# Patient Record
Sex: FEMALE
[De-identification: names, ages, dates, MRNs, and addresses within clinical notes are randomized per-mention and may not be internally consistent; named-entity substitution may affect disease eponyms.]

---

## 2020-09-24 PROBLEM — Z00.00 ENCOUNTER FOR PREVENTIVE HEALTH EXAMINATION: Status: ACTIVE | Noted: 2020-09-24

## 2020-10-01 ENCOUNTER — APPOINTMENT (OUTPATIENT)
Dept: RHEUMATOLOGY | Facility: CLINIC | Age: 60
End: 2020-10-01
Payer: COMMERCIAL

## 2020-10-01 VITALS
OXYGEN SATURATION: 99 % | WEIGHT: 140 LBS | SYSTOLIC BLOOD PRESSURE: 125 MMHG | TEMPERATURE: 97.9 F | DIASTOLIC BLOOD PRESSURE: 83 MMHG | HEART RATE: 76 BPM

## 2020-10-01 VITALS — BODY MASS INDEX: 24.8 KG/M2 | HEIGHT: 63 IN

## 2020-10-01 PROCEDURE — 36415 COLL VENOUS BLD VENIPUNCTURE: CPT

## 2020-10-01 PROCEDURE — 99205 OFFICE O/P NEW HI 60 MIN: CPT | Mod: 25

## 2020-10-02 NOTE — HISTORY OF PRESENT ILLNESS
[FreeTextEntry1] : 60 year old female with previously diagnosed Sjogren's syndrome presents for evaluation \par \par Previously seen and diagnosed by Dr. Prado, noted to have +MARIANNA, RF, SSa and dry mouth. No biopsy or parotid ultrasound done. Has been taking Plaquenil and MTX 10mg weekly with folic acid. Denies relief of joint pain with these therapies. \par \par Now with dry mouth\par No dry eyes\par Diffuse aches\par Fatigue\par Denies swelling in small joints\par \par In July she thought she pulled a muscle in her groin \par Now with sharp pain down the back of her LE \par Just walking when this started\par Still only bothers her when active\par Will take Celebrex PRN pain \par Did try a short course of Prednisone for the pain without much relief \par \par No swelling, no headaches, visual changes, jaw claudication, scalp tenderness, hand swelling \par \par Data reviewed:\par CT chest with fatty liver \par Knee XR with minimal OA of patellofemoral joint \par MRI C spine with central disc herniation at C3-4 and C4-5 with compression of thecal sac and left side, foraminal narrowing \par MRI shoulder ac joint arthrosis \par DXA 2019 nml \par  [Anorexia] : no anorexia [Weight Loss] : no weight loss [Fever] : no fever [Depression] : no depression [Malar Facial Rash] : no malar facial rash [Skin Lesions] : no lesions [Skin Nodules] : no skin nodules [Oral Ulcers] : no oral ulcers [Cough] : no cough [Dry Mouth] : no dry mouth [Shortness of Breath] : no shortness of breath [Chest Pain] : no chest pain [Decreased ROM] : no decreased range of motion [Morning Stiffness] : no morning stiffness [Falls] : no falls [Difficulty Standing] : no difficulty standing [Difficulty Walking] : no difficulty walking [Myalgias] : no myalgias [Muscle Weakness] : no muscle weakness [Muscle Spasms] : no muscle spasms [Muscle Cramping] : no muscle cramping [Visual Changes] : no visual changes [Eye Pain] : no eye pain [Eye Redness] : no eye redness [Dry Eyes] : no dry eyes

## 2020-10-02 NOTE — ASSESSMENT
[FreeTextEntry1] : 60 year old female with previously diagnosed Sjogren's syndrome presents for evaluation \par No evidence of sicca on exam today though patient does endorse dry mouth \par Re check serologies today, consider objective testing or US, lip biopsy \par Continue Plaquenil 200mg BID and f/u with ophtho\par Stop MTX given lack of efficacy and mostly muscular pain, recommend low impact anaerobic exercise\par PT for pulled muscle, voltaren gel, stretching \par

## 2020-10-02 NOTE — PHYSICAL EXAM
[General Appearance - Alert] : alert [General Appearance - In No Acute Distress] : in no acute distress [General Appearance - Well Nourished] : well nourished [General Appearance - Well Developed] : well developed [General Appearance - Well-Appearing] : healthy appearing [Sclera] : the sclera and conjunctiva were normal [FreeTextEntry1] : no dry mouth or parotid enlargement, no tongue furrowing [Respiration, Rhythm And Depth] : normal respiratory rhythm and effort [Auscultation Breath Sounds / Voice Sounds] : lungs were clear to auscultation bilaterally [Heart Rate And Rhythm] : heart rate was normal and rhythm regular [Heart Sounds] : normal S1 and S2 [Edema] : there was no peripheral edema [Cervical Lymph Nodes Enlarged Anterior Bilaterally] : anterior cervical [No Spinal Tenderness] : no spinal tenderness [Musculoskeletal - Swelling] : no joint swelling seen [Oriented To Time, Place, And Person] : oriented to person, place, and time

## 2020-10-07 LAB
ALBUMIN SERPL ELPH-MCNC: 4.4 G/DL
ALP BLD-CCNC: 103 U/L
ALT SERPL-CCNC: 9 U/L
ANA SER IF-ACNC: NEGATIVE
ANION GAP SERPL CALC-SCNC: 16 MMOL/L
APTT 2H P 1:4 NP PPP: 33.6 SEC
APTT 2H P INC PPP: 34.7 SEC
APTT HEX PL PPP: NEGATIVE
APTT IMM NP/PRE NP PPP: 33.2 SEC
APTT INV RATIO PPP: 35.7 SEC
AST SERPL-CCNC: 17 U/L
B2 GLYCOPROT1 IGA SERPL IA-ACNC: <5 SAU
B2 GLYCOPROT1 IGG SER-ACNC: <5 SGU
B2 GLYCOPROT1 IGM SER-ACNC: <5 SMU
BASOPHILS # BLD AUTO: 0.05 K/UL
BASOPHILS NFR BLD AUTO: 0.7 %
BILIRUB SERPL-MCNC: 0.3 MG/DL
BUN SERPL-MCNC: 12 MG/DL
C3 SERPL-MCNC: 188 MG/DL
C4 SERPL-MCNC: 34 MG/DL
CALCIUM SERPL-MCNC: 9.7 MG/DL
CARDIOLIPIN IGM SER-MCNC: <5 GPL
CCP AB SER IA-ACNC: <8 UNITS
CHLORIDE SERPL-SCNC: 102 MMOL/L
CO2 SERPL-SCNC: 22 MMOL/L
CONFIRM: 28.1 SEC
CREAT SERPL-MCNC: 0.62 MG/DL
CRP SERPL-MCNC: 2.67 MG/DL
DEPRECATED CARDIOLIPIN IGA SER: <5 APL
DRVVT IMM 1:2 NP PPP: NORMAL
DRVVT SCREEN TO CONFIRM RATIO: 1.05 RATIO
DSDNA AB SER-ACNC: <12 IU/ML
ENA RNP AB SER IA-ACNC: <0.2 AL
ENA SM AB SER IA-ACNC: <0.2 AL
ENA SS-A AB SER IA-ACNC: 6.1 AL
ENA SS-B AB SER IA-ACNC: <0.2 AL
EOSINOPHIL # BLD AUTO: 0.07 K/UL
EOSINOPHIL NFR BLD AUTO: 1 %
ERYTHROCYTE [SEDIMENTATION RATE] IN BLOOD BY WESTERGREN METHOD: 47 MM/HR
GLUCOSE SERPL-MCNC: 95 MG/DL
HCT VFR BLD CALC: 37.5 %
HEX-1: 40.4 SECS
HEX-2: 38.8 SECS
HGB BLD-MCNC: 11.8 G/DL
IGA SER QL IEP: 171 MG/DL
IGG SER QL IEP: 863 MG/DL
IGM SER QL IEP: 62 MG/DL
IMM GRANULOCYTES NFR BLD AUTO: 0.1 %
LYMPHOCYTES # BLD AUTO: 2.01 K/UL
LYMPHOCYTES NFR BLD AUTO: 29.3 %
MAN DIFF?: NORMAL
MCHC RBC-ENTMCNC: 30.2 PG
MCHC RBC-ENTMCNC: 31.5 GM/DL
MCV RBC AUTO: 95.9 FL
MONOCYTES # BLD AUTO: 0.59 K/UL
MONOCYTES NFR BLD AUTO: 8.6 %
NEUTROPHILS # BLD AUTO: 4.13 K/UL
NEUTROPHILS NFR BLD AUTO: 60.3 %
NPP NORMAL POOLED PLASMA: 33.4 SECS
PLATELET # BLD AUTO: 462 K/UL
POTASSIUM SERPL-SCNC: 4.5 MMOL/L
PROT SERPL-MCNC: 7 G/DL
PS IGA SER QL: <20 U/ML
PS IGG SER QL: <10 U/ML
PS IGM SER QL: <25 U/ML
RBC # BLD: 3.91 M/UL
RBC # FLD: 14.9 %
RF+CCP IGG SER-IMP: NEGATIVE
RHEUMATOID FACT SER QL: <10 IU/ML
SCREEN DRVVT: 32.8 SEC
SILICA CLOTTING TIME INTERPRETATION: NORMAL
SILICA CLOTTING TIME S/C: 0.99 RATIO
SODIUM SERPL-SCNC: 141 MMOL/L
WBC # FLD AUTO: 6.86 K/UL

## 2020-10-08 LAB — CARDIOLIPIN IGM SER-MCNC: 5.6 MPL

## 2020-11-20 ENCOUNTER — APPOINTMENT (OUTPATIENT)
Dept: RHEUMATOLOGY | Facility: CLINIC | Age: 60
End: 2020-11-20
Payer: COMMERCIAL

## 2020-11-20 VITALS
BODY MASS INDEX: 22.34 KG/M2 | DIASTOLIC BLOOD PRESSURE: 84 MMHG | TEMPERATURE: 97.8 F | WEIGHT: 139 LBS | HEART RATE: 87 BPM | SYSTOLIC BLOOD PRESSURE: 138 MMHG | OXYGEN SATURATION: 100 % | HEIGHT: 66 IN

## 2020-11-20 PROCEDURE — 99214 OFFICE O/P EST MOD 30 MIN: CPT | Mod: 25

## 2020-11-20 PROCEDURE — 36415 COLL VENOUS BLD VENIPUNCTURE: CPT

## 2020-11-20 RX ORDER — CELECOXIB 200 MG/1
200 CAPSULE ORAL TWICE DAILY
Qty: 60 | Refills: 3 | Status: ACTIVE | COMMUNITY
Start: 2020-11-20 | End: 1900-01-01

## 2020-11-20 RX ORDER — ASPIRIN 81 MG/1
81 TABLET ORAL DAILY
Qty: 30 | Refills: 11 | Status: ACTIVE | COMMUNITY
Start: 2020-11-20 | End: 1900-01-01

## 2020-11-23 NOTE — PHYSICAL EXAM
[General Appearance - Alert] : alert [General Appearance - In No Acute Distress] : in no acute distress [General Appearance - Well Nourished] : well nourished [General Appearance - Well Developed] : well developed [General Appearance - Well-Appearing] : healthy appearing [Sclera] : the sclera and conjunctiva were normal [Respiration, Rhythm And Depth] : normal respiratory rhythm and effort [Auscultation Breath Sounds / Voice Sounds] : lungs were clear to auscultation bilaterally [Heart Rate And Rhythm] : heart rate was normal and rhythm regular [Heart Sounds] : normal S1 and S2 [Edema] : there was no peripheral edema [Cervical Lymph Nodes Enlarged Anterior Bilaterally] : anterior cervical [No Spinal Tenderness] : no spinal tenderness [Musculoskeletal - Swelling] : no joint swelling seen [Oriented To Time, Place, And Person] : oriented to person, place, and time [FreeTextEntry1] : no dry mouth or parotid enlargement, no tongue furrowing

## 2020-11-23 NOTE — ASSESSMENT
[FreeTextEntry1] : 60 year old female with previously diagnosed Sjogren's syndrome presents for follow up \par Doing well overall \par Continue Plaquenil 200mg BID, needs to f/u with ophtho\par Encourage exercise\par Check serologies

## 2020-11-23 NOTE — HISTORY OF PRESENT ILLNESS
[FreeTextEntry1] : Initial Visit:\par Previously seen and diagnosed by Dr. Prado, noted to have +MARIANNA, RF, SSa and dry mouth. No biopsy or parotid ultrasound done. Has been taking Plaquenil and MTX 10mg weekly with folic acid. Denies relief of joint pain with these therapies. \par Now with dry mouth\par No dry eyes\par Diffuse aches\par Fatigue\par Denies swelling in small joints\par In July she thought she pulled a muscle in her groin \par Now with sharp pain down the back of her LE \par Just walking when this started\par Still only bothers her when active\par Will take Celebrex PRN pain \par Did try a short course of Prednisone for the pain without much relief \par No swelling, no headaches, visual changes, jaw claudication, scalp tenderness, hand swelling \par Data reviewed:\par CT chest with fatty liver \par Knee XR with minimal OA of patellofemoral joint \par MRI C spine with central disc herniation at C3-4 and C4-5 with compression of thecal sac and left side, foraminal narrowing \par MRI shoulder ac joint arthrosis \par DXA 2019 nml \par Plan: No evidence of sicca on exam today though patient does endorse dry mouth \par Re check serologies today, consider objective testing or US, lip biopsy \par Continue Plaquenil 200mg BID and f/u with ophtho\par Stop MTX given lack of efficacy and mostly muscular pain, recommend low impact anaerobic exercise\par PT for pulled muscle, voltaren gel, stretching  [Anorexia] : no anorexia [Weight Loss] : no weight loss [Fever] : no fever [Depression] : no depression [Malar Facial Rash] : no malar facial rash [Skin Lesions] : no lesions [Skin Nodules] : no skin nodules [Oral Ulcers] : no oral ulcers [Cough] : no cough [Dry Mouth] : no dry mouth [Shortness of Breath] : no shortness of breath [Chest Pain] : no chest pain [Decreased ROM] : no decreased range of motion [Morning Stiffness] : no morning stiffness [Falls] : no falls [Difficulty Standing] : no difficulty standing [Difficulty Walking] : no difficulty walking [Myalgias] : no myalgias [Muscle Weakness] : no muscle weakness [Muscle Spasms] : no muscle spasms [Muscle Cramping] : no muscle cramping [Visual Changes] : no visual changes [Eye Pain] : no eye pain [Eye Redness] : no eye redness [Dry Eyes] : no dry eyes

## 2020-11-25 LAB
ALBUMIN SERPL ELPH-MCNC: 4.4 G/DL
ALP BLD-CCNC: 104 U/L
ALT SERPL-CCNC: 13 U/L
ANION GAP SERPL CALC-SCNC: 19 MMOL/L
AST SERPL-CCNC: 26 U/L
BASOPHILS # BLD AUTO: 0.05 K/UL
BASOPHILS NFR BLD AUTO: 0.7 %
BILIRUB SERPL-MCNC: 0.2 MG/DL
BUN SERPL-MCNC: 16 MG/DL
C3 SERPL-MCNC: 166 MG/DL
C4 SERPL-MCNC: 30 MG/DL
CALCIUM SERPL-MCNC: 9.5 MG/DL
CCP AB SER IA-ACNC: <8 UNITS
CHLORIDE SERPL-SCNC: 103 MMOL/L
CO2 SERPL-SCNC: 18 MMOL/L
CREAT SERPL-MCNC: 0.7 MG/DL
CRP SERPL-MCNC: 1.29 MG/DL
EOSINOPHIL # BLD AUTO: 0.06 K/UL
EOSINOPHIL NFR BLD AUTO: 0.8 %
ERYTHROCYTE [SEDIMENTATION RATE] IN BLOOD BY WESTERGREN METHOD: 31 MM/HR
GLUCOSE SERPL-MCNC: 77 MG/DL
HCT VFR BLD CALC: 38.4 %
HGB BLD-MCNC: 11.7 G/DL
IGA SER QL IEP: 179 MG/DL
IGG SER QL IEP: 1002 MG/DL
IGM SER QL IEP: 57 MG/DL
IMM GRANULOCYTES NFR BLD AUTO: 0 %
LYMPHOCYTES # BLD AUTO: 2.22 K/UL
LYMPHOCYTES NFR BLD AUTO: 31.1 %
MAN DIFF?: NORMAL
MCHC RBC-ENTMCNC: 28.7 PG
MCHC RBC-ENTMCNC: 30.5 GM/DL
MCV RBC AUTO: 94.1 FL
MONOCYTES # BLD AUTO: 0.58 K/UL
MONOCYTES NFR BLD AUTO: 8.1 %
NEUTROPHILS # BLD AUTO: 4.23 K/UL
NEUTROPHILS NFR BLD AUTO: 59.3 %
PLATELET # BLD AUTO: 429 K/UL
POTASSIUM SERPL-SCNC: 5.1 MMOL/L
PROT SERPL-MCNC: 7 G/DL
RBC # BLD: 4.08 M/UL
RBC # FLD: 13.3 %
RF+CCP IGG SER-IMP: NEGATIVE
RHEUMATOID FACT SER QL: <10 IU/ML
SODIUM SERPL-SCNC: 140 MMOL/L
WBC # FLD AUTO: 7.14 K/UL

## 2021-01-07 RX ORDER — HYDROXYCHLOROQUINE SULFATE 200 MG/1
200 TABLET, FILM COATED ORAL TWICE DAILY
Qty: 60 | Refills: 6 | Status: ACTIVE | COMMUNITY
Start: 2020-11-20 | End: 1900-01-01

## 2021-02-11 ENCOUNTER — APPOINTMENT (OUTPATIENT)
Dept: RHEUMATOLOGY | Facility: CLINIC | Age: 61
End: 2021-02-11
Payer: COMMERCIAL

## 2021-02-11 VITALS
WEIGHT: 142 LBS | SYSTOLIC BLOOD PRESSURE: 134 MMHG | BODY MASS INDEX: 22.82 KG/M2 | DIASTOLIC BLOOD PRESSURE: 84 MMHG | OXYGEN SATURATION: 97 % | HEIGHT: 66 IN | HEART RATE: 84 BPM | TEMPERATURE: 97.9 F

## 2021-02-11 DIAGNOSIS — M35.00 SICCA SYNDROME, UNSPECIFIED: ICD-10-CM

## 2021-02-11 PROCEDURE — 20610 DRAIN/INJ JOINT/BURSA W/O US: CPT

## 2021-02-11 PROCEDURE — 99072 ADDL SUPL MATRL&STAF TM PHE: CPT

## 2021-02-11 PROCEDURE — 36415 COLL VENOUS BLD VENIPUNCTURE: CPT

## 2021-02-11 PROCEDURE — 99214 OFFICE O/P EST MOD 30 MIN: CPT | Mod: 25

## 2021-02-15 RX ADMIN — Medication %: at 00:00

## 2021-02-15 RX ADMIN — METHYLPREDNISOLONE ACETATE MG/ML: 40 INJECTION, SUSPENSION INTRA-ARTICULAR; INTRALESIONAL; INTRAMUSCULAR; SOFT TISSUE at 00:00

## 2021-02-15 NOTE — PROCEDURE
[Today's Date:] : Date: [unfilled] [Soft Tissue Injection] : soft tissue injection was performed [Arthrocentesis] : arthrocentesis was performed [Patient] : the patient [Risks] : risks [Benefits] : benefits [Alternatives] : alternatives [Therapeutic] : therapeutic [#1 Site: ______] : #1 site identified in the [unfilled] [___ ml Inj] : [unfilled] ~Uml [1%] : 1%  [Without Epi] : without epinephrine [Alcohol] : alcohol [Chlorhexidine] : chlorhexidine [22 gauge 1 inch] : A 22 gauge 1 inch needle was used [___ml of fluid] : [unfilled] ml of fluid was aspirated [Depomedrol ___ mg] : Depomedrol [unfilled] mg [Tolerated Well] : the patient tolerated the procedure well [Reports Improvement in Symptoms] : reports improvement in symptoms [No Complications] : there were no complications

## 2021-02-15 NOTE — HISTORY OF PRESENT ILLNESS
[FreeTextEntry1] : Office Visit 11/20/20:\par Ongoing stiffness in her anterior thighs - mostly after sitting for over 15 minutes\par 5 weeks of PT with relief of her pain - needs a break for now \par She is taking Celebrex rarely, PRN. \par No sicca \par Plan: Doing well overall \par Continue Plaquenil 200mg BID, needs to f/u with ophtho\par Encourage exercise\par Check serologies\par \par Initial Visit:\par Previously seen and diagnosed by Dr. Prado, noted to have +MARIANNA, RF, SSa and dry mouth. No biopsy or parotid ultrasound done. Has been taking Plaquenil and MTX 10mg weekly with folic acid. Denies relief of joint pain with these therapies. \par Now with dry mouth\par No dry eyes\par Diffuse aches\par Fatigue\par Denies swelling in small joints\par In July she thought she pulled a muscle in her groin \par Now with sharp pain down the back of her LE \par Just walking when this started\par Still only bothers her when active\par Will take Celebrex PRN pain \par Did try a short course of Prednisone for the pain without much relief \par No swelling, no headaches, visual changes, jaw claudication, scalp tenderness, hand swelling \par Data reviewed:\par CT chest with fatty liver \par Knee XR with minimal OA of patellofemoral joint \par MRI C spine with central disc herniation at C3-4 and C4-5 with compression of thecal sac and left side, foraminal narrowing \par MRI shoulder ac joint arthrosis \par DXA 2019 nml \par Plan: No evidence of sicca on exam today though patient does endorse dry mouth \par Re check serologies today, consider objective testing or US, lip biopsy \par Continue Plaquenil 200mg BID and f/u with ophtho\par Stop MTX given lack of efficacy and mostly muscular pain, recommend low impact anaerobic exercise\par PT for pulled muscle, voltaren gel, stretching  [Anorexia] : no anorexia [Weight Loss] : no weight loss [Fever] : no fever [Malar Facial Rash] : no malar facial rash [Depression] : no depression [Skin Lesions] : no lesions [Skin Nodules] : no skin nodules [Oral Ulcers] : no oral ulcers [Cough] : no cough [Dry Mouth] : no dry mouth [Chest Pain] : no chest pain [Shortness of Breath] : no shortness of breath [Decreased ROM] : no decreased range of motion [Morning Stiffness] : no morning stiffness [Falls] : no falls [Difficulty Walking] : no difficulty walking [Difficulty Standing] : no difficulty standing [Myalgias] : no myalgias [Muscle Weakness] : no muscle weakness [Muscle Spasms] : no muscle spasms [Muscle Cramping] : no muscle cramping [Eye Pain] : no eye pain [Visual Changes] : no visual changes [Eye Redness] : no eye redness [Dry Eyes] : no dry eyes

## 2021-02-15 NOTE — PHYSICAL EXAM
[General Appearance - Alert] : alert [General Appearance - In No Acute Distress] : in no acute distress [General Appearance - Well Nourished] : well nourished [General Appearance - Well Developed] : well developed [General Appearance - Well-Appearing] : healthy appearing [Sclera] : the sclera and conjunctiva were normal [Respiration, Rhythm And Depth] : normal respiratory rhythm and effort [Auscultation Breath Sounds / Voice Sounds] : lungs were clear to auscultation bilaterally [Heart Rate And Rhythm] : heart rate was normal and rhythm regular [Heart Sounds] : normal S1 and S2 [Edema] : there was no peripheral edema [Cervical Lymph Nodes Enlarged Anterior Bilaterally] : anterior cervical [No Spinal Tenderness] : no spinal tenderness [Musculoskeletal - Swelling] : no joint swelling seen [Oriented To Time, Place, And Person] : oriented to person, place, and time [FreeTextEntry1] : baker cyst L knee

## 2021-02-15 NOTE — ASSESSMENT
[FreeTextEntry1] : 60 year old female with previously diagnosed Sjogren's syndrome presents for follow up \par Presents today for evaluation of a baker cyst. \par Aspiration of 10cc fluid, sent for culture/gram stain/cell count. \par Check serologies. \par Continue Plaquenil.

## 2021-02-16 DIAGNOSIS — M19.90 UNSPECIFIED OSTEOARTHRITIS, UNSPECIFIED SITE: ICD-10-CM

## 2021-02-16 LAB
ALBUMIN SERPL ELPH-MCNC: 4.2 G/DL
ALP BLD-CCNC: 114 U/L
ALT SERPL-CCNC: 7 U/L
ANION GAP SERPL CALC-SCNC: 16 MMOL/L
AST SERPL-CCNC: 16 U/L
B PERT IGG+IGM PNL SER: ABNORMAL
BASOPHILS # BLD AUTO: 0.06 K/UL
BASOPHILS NFR BLD AUTO: 0.8 %
BILIRUB SERPL-MCNC: 0.2 MG/DL
BUN SERPL-MCNC: 10 MG/DL
C3 SERPL-MCNC: 191 MG/DL
C4 SERPL-MCNC: 33 MG/DL
CALCIUM SERPL-MCNC: 9.2 MG/DL
CHLORIDE SERPL-SCNC: 100 MMOL/L
CO2 SERPL-SCNC: 23 MMOL/L
COLOR FLD: NORMAL
CREAT SERPL-MCNC: 0.78 MG/DL
CRP SERPL-MCNC: 6.34 MG/DL
EOSINOPHIL # BLD AUTO: 0.04 K/UL
EOSINOPHIL NFR BLD AUTO: 0.5 %
ERYTHROCYTE [SEDIMENTATION RATE] IN BLOOD BY WESTERGREN METHOD: 34 MM/HR
FLUID INTAKE SUBSTANCE CLASS: NORMAL
GLUCOSE SERPL-MCNC: 101 MG/DL
HCT VFR BLD CALC: 37.6 %
HGB BLD-MCNC: 11.6 G/DL
IGA SER QL IEP: 196 MG/DL
IGG SER QL IEP: 1021 MG/DL
IGM SER QL IEP: 61 MG/DL
IMM GRANULOCYTES NFR BLD AUTO: 0.3 %
LYMPHOCYTES # BLD AUTO: 1.67 K/UL
LYMPHOCYTES # FLD MANUAL: 18 %
LYMPHOCYTES NFR BLD AUTO: 22.1 %
MAN DIFF?: NORMAL
MCHC RBC-ENTMCNC: 28.6 PG
MCHC RBC-ENTMCNC: 30.9 GM/DL
MCV RBC AUTO: 92.8 FL
MONOCYTES # BLD AUTO: 0.63 K/UL
MONOCYTES NFR BLD AUTO: 8.3 %
MONOS+MACROS NFR FLD MANUAL: 5 %
NEUTROPHILS # BLD AUTO: 5.13 K/UL
NEUTROPHILS NFR BLD AUTO: 68 %
NEUTS SEG # FLD MANUAL: 77 %
PLATELET # BLD AUTO: 463 K/UL
POTASSIUM SERPL-SCNC: 4.7 MMOL/L
PROT SERPL-MCNC: 7.1 G/DL
RBC # BLD: 4.05 M/UL
RBC # FLD MANUAL: ABNORMAL /UL
RBC # FLD: 13.2 %
RHEUMATOID FACT SER QL: 10 IU/ML
SODIUM SERPL-SCNC: 139 MMOL/L
SYCRY CLARITY: ABNORMAL
SYCRY COLOR: YELLOW
SYCRY ID: NORMAL
SYCRY TUBE: NORMAL
TOTAL CELLS COUNTED FLD: 7810 /UL
TUBE TYPE: NORMAL
WBC # FLD AUTO: 7.55 K/UL

## 2021-02-16 RX ORDER — METHYLPRED ACET/NACL,ISO-OS/PF 80 MG/ML
80 VIAL (ML) INJECTION
Qty: 1 | Refills: 0 | Status: COMPLETED | OUTPATIENT
Start: 2021-02-15

## 2021-02-16 RX ORDER — LIDOCAINE HYDROCHLORIDE 10 MG/ML
1 INJECTION, SOLUTION INFILTRATION; PERINEURAL
Qty: 0 | Refills: 0 | Status: COMPLETED | OUTPATIENT
Start: 2021-02-15

## 2021-02-26 LAB — BACTERIA FLD CULT: NORMAL

## 2021-03-29 ENCOUNTER — EMERGENCY (EMERGENCY)
Facility: HOSPITAL | Age: 61
LOS: 1 days | Discharge: HOME | End: 2021-03-29
Attending: EMERGENCY MEDICINE | Admitting: EMERGENCY MEDICINE
Payer: COMMERCIAL

## 2021-03-29 ENCOUNTER — NON-APPOINTMENT (OUTPATIENT)
Age: 61
End: 2021-03-29

## 2021-03-29 VITALS
WEIGHT: 134.92 LBS | HEART RATE: 84 BPM | RESPIRATION RATE: 20 BRPM | HEIGHT: 63 IN | DIASTOLIC BLOOD PRESSURE: 83 MMHG | OXYGEN SATURATION: 98 % | TEMPERATURE: 98 F | SYSTOLIC BLOOD PRESSURE: 130 MMHG

## 2021-03-29 DIAGNOSIS — M19.90 UNSPECIFIED OSTEOARTHRITIS, UNSPECIFIED SITE: ICD-10-CM

## 2021-03-29 DIAGNOSIS — M79.89 OTHER SPECIFIED SOFT TISSUE DISORDERS: ICD-10-CM

## 2021-03-29 DIAGNOSIS — Z87.39 PERSONAL HISTORY OF OTHER DISEASES OF THE MUSCULOSKELETAL SYSTEM AND CONNECTIVE TISSUE: ICD-10-CM

## 2021-03-29 DIAGNOSIS — I25.42 CORONARY ARTERY DISSECTION: ICD-10-CM

## 2021-03-29 DIAGNOSIS — M71.22 SYNOVIAL CYST OF POPLITEAL SPACE [BAKER], LEFT KNEE: ICD-10-CM

## 2021-03-29 LAB
ANION GAP SERPL CALC-SCNC: 10 MMOL/L — SIGNIFICANT CHANGE UP (ref 9–16)
APTT BLD: 35.4 SEC — SIGNIFICANT CHANGE UP (ref 27.5–35.5)
BASOPHILS # BLD AUTO: 0.04 K/UL — SIGNIFICANT CHANGE UP (ref 0–0.2)
BASOPHILS NFR BLD AUTO: 0.5 % — SIGNIFICANT CHANGE UP (ref 0–2)
BUN SERPL-MCNC: 18 MG/DL — SIGNIFICANT CHANGE UP (ref 7–23)
CALCIUM SERPL-MCNC: 8.9 MG/DL — SIGNIFICANT CHANGE UP (ref 8.5–10.5)
CHLORIDE SERPL-SCNC: 105 MMOL/L — SIGNIFICANT CHANGE UP (ref 96–108)
CO2 SERPL-SCNC: 27 MMOL/L — SIGNIFICANT CHANGE UP (ref 22–31)
CREAT SERPL-MCNC: 0.55 MG/DL — SIGNIFICANT CHANGE UP (ref 0.5–1.3)
D DIMER BLD IA.RAPID-MCNC: 750 NG/ML DDU — HIGH
EOSINOPHIL # BLD AUTO: 0.12 K/UL — SIGNIFICANT CHANGE UP (ref 0–0.5)
EOSINOPHIL NFR BLD AUTO: 1.6 % — SIGNIFICANT CHANGE UP (ref 0–6)
GLUCOSE SERPL-MCNC: 96 MG/DL — SIGNIFICANT CHANGE UP (ref 70–99)
HCT VFR BLD CALC: 34.2 % — LOW (ref 34.5–45)
HGB BLD-MCNC: 10.9 G/DL — LOW (ref 11.5–15.5)
IMM GRANULOCYTES NFR BLD AUTO: 0.3 % — SIGNIFICANT CHANGE UP (ref 0–1.5)
INR BLD: 1.04 — SIGNIFICANT CHANGE UP (ref 0.88–1.16)
LYMPHOCYTES # BLD AUTO: 1.64 K/UL — SIGNIFICANT CHANGE UP (ref 1–3.3)
LYMPHOCYTES # BLD AUTO: 21.8 % — SIGNIFICANT CHANGE UP (ref 13–44)
MCHC RBC-ENTMCNC: 29 PG — SIGNIFICANT CHANGE UP (ref 27–34)
MCHC RBC-ENTMCNC: 31.9 GM/DL — LOW (ref 32–36)
MCV RBC AUTO: 91 FL — SIGNIFICANT CHANGE UP (ref 80–100)
MONOCYTES # BLD AUTO: 0.57 K/UL — SIGNIFICANT CHANGE UP (ref 0–0.9)
MONOCYTES NFR BLD AUTO: 7.6 % — SIGNIFICANT CHANGE UP (ref 2–14)
NEUTROPHILS # BLD AUTO: 5.14 K/UL — SIGNIFICANT CHANGE UP (ref 1.8–7.4)
NEUTROPHILS NFR BLD AUTO: 68.2 % — SIGNIFICANT CHANGE UP (ref 43–77)
NRBC # BLD: 0 /100 WBCS — SIGNIFICANT CHANGE UP (ref 0–0)
PLATELET # BLD AUTO: 355 K/UL — SIGNIFICANT CHANGE UP (ref 150–400)
POTASSIUM SERPL-MCNC: 4 MMOL/L — SIGNIFICANT CHANGE UP (ref 3.5–5.3)
POTASSIUM SERPL-SCNC: 4 MMOL/L — SIGNIFICANT CHANGE UP (ref 3.5–5.3)
PROTHROM AB SERPL-ACNC: 12.3 SEC — SIGNIFICANT CHANGE UP (ref 10.6–13.6)
RBC # BLD: 3.76 M/UL — LOW (ref 3.8–5.2)
RBC # FLD: 13.8 % — SIGNIFICANT CHANGE UP (ref 10.3–14.5)
SODIUM SERPL-SCNC: 142 MMOL/L — SIGNIFICANT CHANGE UP (ref 132–145)
WBC # BLD: 7.53 K/UL — SIGNIFICANT CHANGE UP (ref 3.8–10.5)
WBC # FLD AUTO: 7.53 K/UL — SIGNIFICANT CHANGE UP (ref 3.8–10.5)

## 2021-03-29 PROCEDURE — 99285 EMERGENCY DEPT VISIT HI MDM: CPT

## 2021-03-29 PROCEDURE — 93970 EXTREMITY STUDY: CPT | Mod: 26

## 2021-03-29 NOTE — ED PROVIDER NOTE - CLINICAL SUMMARY MEDICAL DECISION MAKING FREE TEXT BOX
59 y/o female with low risk for DVT. Most likely ruptured Baker's cyst given low risk nature. Will order CBC, CMP, coags, and D-dimer. If D-dimer is positive, will obtain US. If negative, will safely r/o DVT as likely Baker's cyst.

## 2021-03-29 NOTE — ED PROVIDER NOTE - PROGRESS NOTE DETAILS
D-dimer is positive. Will perform US to evaluate for DVT. US shows no DVT; shows ruptured Baker's cyst. Explained results to Patient and will discharge. Will advise f/u with Dr. Soto, Patient's rheumatologist, who sent her here.

## 2021-03-29 NOTE — ED PROVIDER NOTE - PATIENT PORTAL LINK FT
You can access the FollowMyHealth Patient Portal offered by [facility] by registering at the following website: [email or web address]. By joining [facility]’s FollowMyHealth portal, you will also be able to view your health information using other applications (apps) compatible with our system.

## 2021-03-29 NOTE — ED ADULT TRIAGE NOTE - CHIEF COMPLAINT QUOTE
Patient complaining of left calf pain/swollen; sent by her rhumatologist for r/o dvt since patient is going to be going on a road-trip to [place]; denies any chest pain/shortness of breath

## 2021-03-29 NOTE — ED ADULT NURSE NOTE - OBJECTIVE STATEMENT
patient present to the Ed c.o 4/10 left sided calf pain. patient had a singh cyst drained from the left knee aq month ago and began feeling the pain r/t down to the calf. patient ambulates without difficulty

## 2021-03-29 NOTE — ED PROVIDER NOTE - MUSCULOSKELETAL, MLM
RLE is normal. LLE; Mild edema, nonpitting. FROM of knee down. No TTP over leg or behind the knee. No warmth, no erythema. 2+ DP.

## 2021-03-29 NOTE — ED PROVIDER NOTE - OBJECTIVE STATEMENT
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  6 weeks ago, Patient had pain and swelling to the back of her left knee and saw her rheumatologist who diagnosed her with a Baker's cyst and aspirated it. Patient reports she had some persistent swelling behind the knee and aching despite that. Patient noticed last night that below the left knee, the area had become swollen and felt mildly tight but declining any pain at this time. She herself believes it's a Baker's cyst that may have ruptured. Denies travel history, recent surgeries, or long periods of immobilization. Patient is travelling to [place] by car this week. Denies anticoagulant use. Denies numbness, weakness, tingling, chest pain, SOB, or palpitations.

## 2021-04-15 ENCOUNTER — LABORATORY RESULT (OUTPATIENT)
Age: 61
End: 2021-04-15

## 2021-04-15 ENCOUNTER — APPOINTMENT (OUTPATIENT)
Dept: RHEUMATOLOGY | Facility: CLINIC | Age: 61
End: 2021-04-15
Payer: COMMERCIAL

## 2021-04-15 VITALS
BODY MASS INDEX: 22.98 KG/M2 | HEIGHT: 66 IN | WEIGHT: 143 LBS | DIASTOLIC BLOOD PRESSURE: 88 MMHG | HEART RATE: 84 BPM | SYSTOLIC BLOOD PRESSURE: 137 MMHG | TEMPERATURE: 98.2 F | OXYGEN SATURATION: 98 %

## 2021-04-15 DIAGNOSIS — M66.0 RUPTURE OF POPLITEAL CYST: ICD-10-CM

## 2021-04-15 DIAGNOSIS — M25.569 PAIN IN UNSPECIFIED KNEE: ICD-10-CM

## 2021-04-15 PROCEDURE — 99072 ADDL SUPL MATRL&STAF TM PHE: CPT

## 2021-04-15 PROCEDURE — 99214 OFFICE O/P EST MOD 30 MIN: CPT | Mod: 25

## 2021-04-15 PROCEDURE — 36415 COLL VENOUS BLD VENIPUNCTURE: CPT

## 2021-04-15 NOTE — PHYSICAL EXAM
[General Appearance - Alert] : alert [General Appearance - In No Acute Distress] : in no acute distress [General Appearance - Well Nourished] : well nourished [General Appearance - Well Developed] : well developed [Abnormal Walk] : normal gait [Skin Color & Pigmentation] : normal skin color and pigmentation [] : no rash [FreeTextEntry1] : There is residual effusion in the left knee but good range of motion no synovitis of those wrists

## 2021-04-15 NOTE — ASSESSMENT
[FreeTextEntry1] : This is a 60-year-old woman she has a history of Sjogren's presumed inflammatory arthritis she remains on hydroxychloroquine she does get relief of knee and wrist pains with Celebrex I did suggest that she continue with this perhaps taking it every day she is scheduled to see an orthopedic surgeon next week I am updating her sed rate and her CRP as patient requested I am also checking Lyme serologies as a possible etiology of this knee pain swelling and ruptured Baker's cyst

## 2021-04-15 NOTE — DATA REVIEWED
[FreeTextEntry1] : I have reviewed her prior blood work she does have an elevated ESR and CRP she does have positive Sjogren's antibodies but negative CCP and rheumatoid factor x-ray of her both knees were reviewed actual films disc return shows no evidence of any arthritic issues duplex scan reviewed showing presumed ruptured Baker's cyst no evidence of DVT
1+ pitting (0-1/4 in) (disappears rapidly)

## 2021-04-15 NOTE — HISTORY OF PRESENT ILLNESS
[FreeTextEntry1] : This is a 60-year-old woman she returns for a follow-up evaluation she has been seen and evaluated by Dr. Jessica Ramesh she has a history of Sjogren's she has Sjogren's antibodies she does have musculoskeletal complaints it appears at one point she had been on methotrexate but now remains on hydroxy chloroquine which she tolerates well she also takes Celebrex she reports she uses it every few days rather than every day and it does help with her pain she does describe some wrist pains she also describes further pain in her left knee she did have this aspirated which showed some bloody fluid I suspect traumatic there were no crystals cultures were negative she subsequently developed swelling in the back of her knee dissecting into the calf she was seen at [facility] emergency room where the D-dimer was elevated but the duplex scan showed no evidence of a DVT it did however show evidence of a ruptured Baker's cyst she has had persistent pain there she reports she is scheduled to see an orthopedic surgeon in the next few days she does not recall any tick exposure or tick bites or diagnosis of Lyme disease although she does report she had a daughter who at one point did have Lyme disease-

## 2021-04-16 LAB
B BURGDOR IGG+IGM SER QL IB: NORMAL
CRP SERPL-MCNC: 23 MG/L
ERYTHROCYTE [SEDIMENTATION RATE] IN BLOOD BY WESTERGREN METHOD: 42 MM/HR

## 2021-04-20 ENCOUNTER — NON-APPOINTMENT (OUTPATIENT)
Age: 61
End: 2021-04-20